# Patient Record
(demographics unavailable — no encounter records)

---

## 2024-10-15 NOTE — PHYSICAL EXAM
[Normal Appearance] : normal appearance [General Appearance - In No Acute Distress] : no acute distress [] : no respiratory distress [Respiration, Rhythm And Depth] : normal respiratory rhythm and effort [Exaggerated Use Of Accessory Muscles For Inspiration] : no accessory muscle use [Normal Station and Gait] : the gait and station were normal for the patient's age [Oriented To Time, Place, And Person] : oriented to person, place, and time [Affect] : the affect was normal [Mood] : the mood was normal [de-identified] : normal peripheral circulation

## 2024-10-15 NOTE — END OF VISIT
[Time Spent: ___ minutes] : I have spent [unfilled] minutes of time on the encounter which excludes teaching and separately reported services. [FreeTextEntry3] : Documented by Senia Tinoco acting as a scribe for Dr. Jameson Felder. 07/23/2024 All medical record entries made by the Scribe were at my, Dr. Jameson Felder, direction and personally dictated by me on 07/23/2024. I have reviewed the chart and agree that the record accurately reflects my personal performance of the history, physical exam, assessment and plan. I have also personally directed, reviewed, and agreed with the chart.  Prior to appointment and during encounter with patient extensive medical records were reviewed including but not limited to, hospital records, outpatient records, imaging results, and lab data.  During this appointment the patient was examined, diagnoses were discussed and explained in a face-to-face manner.  In addition, extensive time was spent reviewing aforementioned diagnostic studies.  Counseling including imaging results, differential diagnoses, treatment options, risk and benefits, lifestyle changes, current condition, and current medications was performed.  Patient's questions and concerns were addressed.  Patient verbalized understanding of the treatment plan.  Time spent is for reviewing chart, labs and images, counseling and care coordination.

## 2024-10-15 NOTE — ASSESSMENT
[FreeTextEntry1] : 10/15/2024: Patient's testosterone on 07/24/2024 was 259.  Recommended to get pending testosterone, FSH, LH, and prolactin.   Will also get PSA at that time.   Will get CT scan of abdomen/pelvis.   Will continue Flomax.  Discussed testosterone replacement therapy.   Reviewed records. Discussed labs and imaging.  CT scan of the abdomen pelvis 12-30-23:  Expected post-surgical changes, perinephric artifact on the left without recurrent or residual pathology in the region.  PSA: 0.65 (12/21/23).  Renal cancer: Will get repeat imaging in six months.  PSA screening: Discussed PSA screening and latest recommendations/guidelines: USPTF and AUA. Continue PSA screening.   Benign Prostatic Hyperplasia: Discussed treatment options, will continue Flomax.   Erectile dysfunction: Discussed treatment options. Will try Tadalafil. Prescribed 20 mg recommended take half tablet first. Discussed possible side effects. Will get free and total testosterone.  Will inform results. Return to office after CT scan: will do uroflow and check PVR.

## 2024-10-15 NOTE — HISTORY OF PRESENT ILLNESS
[FreeTextEntry1] : 10/15/2024: 67-year-old male presents for follow-up.   Did try Tadalafil 20 mg. Got partial response, 2.5/5.   Had informed of low testosterone level. However, did not do repeat labs.   Taking Flomax. Same urination except complains of on-and-off post-void dribbling.    67-year-old male presents for erectile dysfunction. Patient rates his erection 0/5. Has not tried Phosphodiesterase 5 inhibitors.  Reports normal libido. Denies fatigue, tiredness or lethargy. Takes Flomax, has reasonable stream, daytime frequency four to five times and nocturia zero to one time.  No other bother with urination.  Denies dysuria, hematuria, lower abdominal or flank pain, nausea, vomiting, fever, chills or rigors.  Has history of renal cancer. Status post left partial nephrectomy with Dr Smita Randle in August of 2018.  Denies any family history of prostate cancer or kidney cancer.  Patient is a past smoker. Quit eight years ago.  Was smoking 0.5 ppd for 30 years.

## 2024-10-15 NOTE — PHYSICAL EXAM
[Normal Appearance] : normal appearance [General Appearance - In No Acute Distress] : no acute distress [] : no respiratory distress [Respiration, Rhythm And Depth] : normal respiratory rhythm and effort [Exaggerated Use Of Accessory Muscles For Inspiration] : no accessory muscle use [Normal Station and Gait] : the gait and station were normal for the patient's age [Oriented To Time, Place, And Person] : oriented to person, place, and time [Affect] : the affect was normal [Mood] : the mood was normal [de-identified] : normal peripheral circulation

## 2024-10-15 NOTE — ADDENDUM
[FreeTextEntry1] :  Audrey REDMAN assisted in documentation on 10/15/2024  acting as a scribe for Dr. Jameson Felder. 
[FreeTextEntry1] :  Audrey REDMAN assisted in documentation on 10/15/2024  acting as a scribe for Dr. Jameson Felder. 
English

## 2024-12-29 NOTE — ASSESSMENT
[FreeTextEntry1] : CT scan abdomen/pelvis with and without IV contrast (11/14/2024) @ [facility]: Kidney/ureters: noted post-treatment changes in lower left kidney with small foci of metallic artifact and fat necrosis along the posterior margin of the left kidney. No recurrent lesion or evidence of metastatic disease.   Urine bladder: unremarkable.   Reproductive organs: prostate mildly enlarged.  Discussed incidental findings of diverticulosis and right fat-containing small inguinal hernia.    Other non-genitourinary findings were also discussed, including cholelithiasis.   Patient is aware of gallbladder stones.    History of kidney cancer: No concern for recurrence on recent CT scan.  PSA Screening: Patient's PSA was stable.   Hypogonadism: Patient's testosterone was low again with normal FSH, LH, and prolactin.  Discussed treatment options. Will start Xyosted.  Will send prescription for Xyosted. If approved, will be mailed to him.  Will get baseline labs before going on injections.   Will get labs: Total Testosterone, Hemoglobin and Hematocrit in 3 months.  Will get full panel fasting labs: Total Testosterone, Complete blood count, Comprehensive metabolic panel, Hgb A1c, Lipid profile, PSA and Estradiol before follow up appointment.   Benign Prostatic Hyperplasia: See uroflow and PVR.  Discussed concern for overactive bladder.  Patient does not have any bothersome symptoms except for post void dribbling.  Will continue Flomax for now.   Return to office in 6 months or sooner if there are any issues.

## 2024-12-29 NOTE — PHYSICAL EXAM
[Normal Appearance] : normal appearance [General Appearance - In No Acute Distress] : no acute distress [] : no respiratory distress [Respiration, Rhythm And Depth] : normal respiratory rhythm and effort [Normal Station and Gait] : the gait and station were normal for the patient's age [Oriented To Time, Place, And Person] : oriented to person, place, and time [Abdomen Soft] : soft [Abdomen Tenderness] : non-tender [de-identified] : normal peripheral circulation

## 2024-12-29 NOTE — LETTER BODY
[Dear  ___] : Dear  [unfilled], [Courtesy Letter:] : I had the pleasure of seeing your patient, [unfilled], in my office today. [Please see my note below.] : Please see my note below. [Sincerely,] : Sincerely, [FreeTextEntry3] : Jameson Felder MD  of Urology [facility]  [facility] Offices: [address] [facility], [address] [place] [address], [facility] [address] [facility], [place] [address]  TEL: [phone] FAX: [phone]

## 2024-12-29 NOTE — LETTER BODY
[Dear  ___] : Dear  [unfilled], [Courtesy Letter:] : I had the pleasure of seeing your patient, [unfilled], in my office today. [Please see my note below.] : Please see my note below. [Sincerely,] : Sincerely, [FreeTextEntry3] : Jameson Felder MD  of Urology [facility]  [facility] Offices: [address]", [address] [place] [address], [facility] [address] [facility], [place] [address]  TEL: [phone] FAX: [phone]

## 2024-12-29 NOTE — END OF VISIT
[Time Spent: ___ minutes] : I have spent [unfilled] minutes of time on the encounter which excludes teaching and separately reported services. [FreeTextEntry3] : Parts of this note were generated by using [employer] services and Dragon medical dictation software.  A reasonable effort was made to proofread and correct its contents, but typos and mistakes may still remain. If there are any questions or points of clarification needed, please contact my office.   Prior to appointment and during encounter with patient extensive medical records were reviewed including but not limited to, hospital records, outpatient records, imaging results and lab data if available. During this appointment the patient was examined, diagnoses were discussed and explained in a face-to-face manner. In addition, extensive time was spent reviewing aforementioned diagnostic studies. Counseling including test results, differential diagnoses, treatment options, risk and benefits, lifestyle changes, current condition, and current medications was performed. Patient's questions and concerns were addressed. Patient verbalized understanding of the treatment plan. Time spent is for reviewing chart, labs and images if available, counseling and care coordination.

## 2024-12-29 NOTE — LETTER BODY
[Dear  ___] : Dear  [unfilled], [Courtesy Letter:] : I had the pleasure of seeing your patient, [unfilled], in my office today. [Please see my note below.] : Please see my note below. [Sincerely,] : Sincerely, [FreeTextEntry3] : Jameson Felder MD  of Urology [facility]  [facility] Offices: [address] [facility], [address] [place] [address], [facility] [address] [facility], [place] [address]  TEL: [phone] FAX: [phone]

## 2024-12-29 NOTE — PHYSICAL EXAM
[Normal Appearance] : normal appearance [General Appearance - In No Acute Distress] : no acute distress [] : no respiratory distress [Respiration, Rhythm And Depth] : normal respiratory rhythm and effort [Normal Station and Gait] : the gait and station were normal for the patient's age [Oriented To Time, Place, And Person] : oriented to person, place, and time [Abdomen Soft] : soft [Abdomen Tenderness] : non-tender [de-identified] : normal peripheral circulation

## 2024-12-29 NOTE — END OF VISIT
[Time Spent: ___ minutes] : I have spent [unfilled] minutes of time on the encounter which excludes teaching and separately reported services. [FreeTextEntry3] : Parts of this note were generated by using [employer] services and Dragon medical dictation software.  A reasonable effort was made to proofread and correct its contents, but typos and mistakes may still remain. If there are any questions or points of clarification needed, please contact my office.   Prior to appointment and during encounter with patient extensive medical records were reviewed including but not limited to, hospital records, outpatient records, imaging results and lab data if available. During this appointment the patient was examined, diagnoses were discussed and explained in a face-to-face manner. In addition, extensive time was spent reviewing aforementioned diagnostic studies. Counseling including test results, differential diagnoses, treatment options, risk and benefits, lifestyle changes, current condition, and current medications was performed. Patient's questions and concerns were addressed. Patient verbalized understanding of the treatment plan. Time spent is for reviewing chart, labs and images if available, counseling and care coordination.

## 2024-12-29 NOTE — END OF VISIT
[Time Spent: ___ minutes] : I have spent [unfilled] minutes of time on the encounter which excludes teaching and separately reported services. [FreeTextEntry3] : Parts of this note were generated by using [employer] services and Dragon medical dictation software.  A reasonable effort was made to proofread and correct its contents, but typos and mistakes may still remain. If there are any questions or points of clarification needed, please contact my office.   Prior to appointment and during encounter with patient extensive medical records were reviewed including but not limited to, hospital records, outpatient records, imaging results and lab data if available. During this appointment the patient was examined, diagnoses were discussed and explained in a face-to-face manner. In addition, extensive time was spent reviewing aforementioned diagnostic studies. Counseling including test results, differential diagnoses, treatment options, risk and benefits, lifestyle changes, current condition, and current medications was performed. Patient's questions and concerns were addressed. Patient verbalized understanding of the treatment plan. Time spent is for reviewing chart, labs and images if available, counseling and care coordination.

## 2024-12-29 NOTE — PHYSICAL EXAM
[Normal Appearance] : normal appearance [General Appearance - In No Acute Distress] : no acute distress [] : no respiratory distress [Respiration, Rhythm And Depth] : normal respiratory rhythm and effort [Normal Station and Gait] : the gait and station were normal for the patient's age [Oriented To Time, Place, And Person] : oriented to person, place, and time [Abdomen Soft] : soft [Abdomen Tenderness] : non-tender [de-identified] : normal peripheral circulation

## 2024-12-29 NOTE — HISTORY OF PRESENT ILLNESS
[FreeTextEntry1] : Primary care physician: Dr Mamta Gill.  12/17/2024: 67-year-old male presents for follow-up.   Taking Flomax. Same urination.  The main bother is post-void dribbling.   Denies dysuria, hematuria, lower abdominal or flank pain, nausea, vomiting, fever, chills or rigors.  Still taking Tadalafil 20 mg, same response.  Did do CT scan of abdomen/pelvis and labs.   Seen on 10/15/2024 for follow-up.   Did try Tadalafil 20 mg. Got partial response, 2.5/5.   Had informed of low testosterone level. However, did not do repeat labs.   On Flomax. Same urination except complained of on-and-off post-void dribbling.    CT scan of the abdomen pelvis 12-30-23:  Expected post-surgical changes, perinephric artifact on the left without recurrent or residual pathology in the region.  PSA: 0.65 (12/21/23).  Seen on 7/23/2024 for erectile dysfunction. Rated his erection 0/5. Had not tried Phosphodiesterase 5 inhibitors.  Reported normal libido. Denies fatigue, tiredness or lethargy. On Flomax, had reasonable stream, daytime frequency four to five times and nocturia zero to one time.  No other bother with urination.  Has history of renal cancer. Status post left partial nephrectomy with Dr Ordoñez in August of 2018.  Denied any family history of prostate cancer or kidney cancer.  Patient is a past smoker. Quit eight years ago.  Was smoking 0.5 PPD for 30 years.

## 2024-12-29 NOTE — ADDENDUM
[FreeTextEntry1] :  Audrey REDMAN assisted in documentation on 12/18/2024  acting as a scribe for Dr. Jameson Felder. .